# Patient Record
Sex: FEMALE | Race: WHITE
[De-identification: names, ages, dates, MRNs, and addresses within clinical notes are randomized per-mention and may not be internally consistent; named-entity substitution may affect disease eponyms.]

---

## 2018-01-19 ENCOUNTER — HOSPITAL ENCOUNTER (INPATIENT)
Dept: HOSPITAL 96 - M.ERS | Age: 83
LOS: 5 days | Discharge: NURSING, REHAB OR LONG-TERM CARE | DRG: 867 | End: 2018-01-24
Attending: INTERNAL MEDICINE | Admitting: INTERNAL MEDICINE
Payer: MEDICARE

## 2018-01-19 VITALS — DIASTOLIC BLOOD PRESSURE: 75 MMHG | SYSTOLIC BLOOD PRESSURE: 121 MMHG

## 2018-01-19 VITALS — WEIGHT: 91.5 LBS | HEIGHT: 60.98 IN | BODY MASS INDEX: 17.27 KG/M2

## 2018-01-19 VITALS — DIASTOLIC BLOOD PRESSURE: 61 MMHG | SYSTOLIC BLOOD PRESSURE: 121 MMHG

## 2018-01-19 VITALS — SYSTOLIC BLOOD PRESSURE: 145 MMHG | DIASTOLIC BLOOD PRESSURE: 79 MMHG

## 2018-01-19 DIAGNOSIS — Z88.8: ICD-10-CM

## 2018-01-19 DIAGNOSIS — B37.89: Primary | ICD-10-CM

## 2018-01-19 DIAGNOSIS — M81.0: ICD-10-CM

## 2018-01-19 DIAGNOSIS — Z79.899: ICD-10-CM

## 2018-01-19 DIAGNOSIS — R13.10: ICD-10-CM

## 2018-01-19 DIAGNOSIS — R62.7: ICD-10-CM

## 2018-01-19 DIAGNOSIS — E86.0: ICD-10-CM

## 2018-01-19 DIAGNOSIS — F03.90: ICD-10-CM

## 2018-01-19 DIAGNOSIS — E87.0: ICD-10-CM

## 2018-01-19 DIAGNOSIS — N39.0: ICD-10-CM

## 2018-01-19 DIAGNOSIS — K20.9: ICD-10-CM

## 2018-01-19 DIAGNOSIS — J44.1: ICD-10-CM

## 2018-01-19 DIAGNOSIS — R06.00: ICD-10-CM

## 2018-01-19 DIAGNOSIS — J02.8: ICD-10-CM

## 2018-01-19 DIAGNOSIS — Z91.041: ICD-10-CM

## 2018-01-19 DIAGNOSIS — E44.0: ICD-10-CM

## 2018-01-19 DIAGNOSIS — M19.90: ICD-10-CM

## 2018-01-19 DIAGNOSIS — G93.40: ICD-10-CM

## 2018-01-19 DIAGNOSIS — B34.9: ICD-10-CM

## 2018-01-19 DIAGNOSIS — Z88.6: ICD-10-CM

## 2018-01-19 LAB
ABSOLUTE BASOPHILS: 0 THOU/UL (ref 0–0.2)
ABSOLUTE EOSINOPHILS: 0.2 THOU/UL (ref 0–0.7)
ABSOLUTE MONOCYTES: 1 THOU/UL (ref 0–1.2)
ALBUMIN SERPL-MCNC: 2.8 G/DL (ref 3.4–5)
ALP SERPL-CCNC: 119 U/L (ref 46–116)
ALT SERPL-CCNC: 30 U/L (ref 30–65)
ANION GAP SERPL CALC-SCNC: 8 MMOL/L (ref 7–16)
APTT BLD: 24.5 SECONDS (ref 25–31.3)
AST SERPL-CCNC: 30 U/L (ref 15–37)
BASOPHILS NFR BLD AUTO: 0.4 %
BILIRUB SERPL-MCNC: 0.3 MG/DL
BUN SERPL-MCNC: 46 MG/DL (ref 7–18)
CALCIUM SERPL-MCNC: 9.4 MG/DL (ref 8.5–10.1)
CHLORIDE SERPL-SCNC: 109 MMOL/L (ref 98–107)
CO2 SERPL-SCNC: 30 MMOL/L (ref 21–32)
CREAT SERPL-MCNC: 1 MG/DL (ref 0.6–1.3)
EOSINOPHIL NFR BLD: 1.9 %
GLUCOSE SERPL-MCNC: 91 MG/DL (ref 70–99)
GRANULOCYTES NFR BLD MANUAL: 66.7 %
HCT VFR BLD CALC: 37.1 % (ref 37–47)
HGB BLD-MCNC: 11.8 GM/DL (ref 12–15)
INR PPP: 1.1
LIPASE: 392 U/L (ref 73–393)
LYMPHOCYTES # BLD: 2.1 THOU/UL (ref 0.8–5.3)
LYMPHOCYTES NFR BLD AUTO: 20.6 %
MAGNESIUM SERPL-MCNC: 2.5 MG/DL (ref 1.8–2.4)
MCH RBC QN AUTO: 25.2 PG (ref 26–34)
MCHC RBC AUTO-ENTMCNC: 31.9 G/DL (ref 28–37)
MCV RBC: 78.9 FL (ref 80–100)
MONOCYTES NFR BLD: 10.4 %
MPV: 7.8 FL. (ref 7.2–11.1)
NEUTROPHILS # BLD: 6.7 THOU/UL (ref 1.6–8.1)
NT-PRO BRAIN NAT PEPTIDE: 132 PG/ML (ref ?–300)
NUCLEATED RBCS: 0 /100WBC
PLATELET COUNT*: 400 THOU/UL (ref 150–400)
POTASSIUM SERPL-SCNC: 3.7 MMOL/L (ref 3.5–5.1)
PROT SERPL-MCNC: 7.8 G/DL (ref 6.4–8.2)
PROTHROMBIN TIME: 10.6 SECONDS (ref 9.2–11.5)
RBC # BLD AUTO: 4.7 MIL/UL (ref 4.2–5)
RDW-CV: 17.6 % (ref 10.5–14.5)
SODIUM SERPL-SCNC: 147 MMOL/L (ref 136–145)
TROPONIN-I LEVEL: <0.06 NG/ML (ref ?–0.06)
WBC # BLD AUTO: 10.1 THOU/UL (ref 4–11)

## 2018-01-19 NOTE — PROC
[address]  [address]                    PROCEDURE REPORT              
_______________________________________________________________________________
 
Name:       URIEL GUY              Room:           [address] [place]  
.R.#:  [id number]      Account #:      [id number]  
Admission:  01/19/18     Attend Phys:    Randall Faulkner MD 
Discharge:  01/24/18     Date of Birth:  [birth year]  
         Report #: [id number]
                                                                                
_______________________________________________________________________________
THIS REPORT FOR:  //name//                      
 
For additional GI report details, please see the Provation report in Perceptive
7 content.
 
 
 
 
 
 
 
 
 
 
 
 
 
 
 
 
 
 
 
 
 
 
 
 
 
 
 
 
 
 
 
 
 
 
 
 
 
 
 
 
                       
                                        By:                                
                 
D: 01/22/18     _______________________________________
T: 01/25/18 1325Medical Records Staff ADELINE       /AL

## 2018-01-20 VITALS — SYSTOLIC BLOOD PRESSURE: 153 MMHG | DIASTOLIC BLOOD PRESSURE: 91 MMHG

## 2018-01-20 VITALS — SYSTOLIC BLOOD PRESSURE: 132 MMHG | DIASTOLIC BLOOD PRESSURE: 55 MMHG

## 2018-01-20 VITALS — DIASTOLIC BLOOD PRESSURE: 43 MMHG | SYSTOLIC BLOOD PRESSURE: 138 MMHG

## 2018-01-20 LAB
%HYPO/RBC NFR BLD AUTO: (no result) %
ABSOLUTE MONOCYTES: 0.1 THOU/UL (ref 0–1.2)
ANION GAP SERPL CALC-SCNC: 11 MMOL/L (ref 7–16)
ANISOCYTOSIS BLD QL SMEAR: (no result)
BUN SERPL-MCNC: 44 MG/DL (ref 7–18)
CALCIUM SERPL-MCNC: 8.4 MG/DL (ref 8.5–10.1)
CHLORIDE SERPL-SCNC: 112 MMOL/L (ref 98–107)
CO2 SERPL-SCNC: 25 MMOL/L (ref 21–32)
CREAT SERPL-MCNC: 1 MG/DL (ref 0.6–1.3)
GLUCOSE SERPL-MCNC: 175 MG/DL (ref 70–99)
GRANULOCYTES NFR BLD MANUAL: 81 %
HCT VFR BLD CALC: 33 % (ref 37–47)
HGB BLD-MCNC: 10.5 GM/DL (ref 12–15)
LYMPHOCYTES # BLD: 0.4 THOU/UL (ref 0.8–5.3)
LYMPHOCYTES NFR BLD AUTO: 6 %
MCH RBC QN AUTO: 24.8 PG (ref 26–34)
MCHC RBC AUTO-ENTMCNC: 31.8 G/DL (ref 28–37)
MCV RBC: 77.8 FL (ref 80–100)
MONOCYTES NFR BLD: 2 %
MPV: 8.4 FL. (ref 7.2–11.1)
NEUTROPHILS # BLD: 6 THOU/UL (ref 1.6–8.1)
NEUTS BAND NFR BLD: 11 %
NUCLEATED RBCS: 0 /100WBC
PLATELET # BLD EST: ADEQUATE 10*3/UL
PLATELET COUNT*: 341 THOU/UL (ref 150–400)
POTASSIUM SERPL-SCNC: 3.9 MMOL/L (ref 3.5–5.1)
RBC # BLD AUTO: 4.25 MIL/UL (ref 4.2–5)
RDW-CV: 17.3 % (ref 10.5–14.5)
SODIUM SERPL-SCNC: 148 MMOL/L (ref 136–145)
WBC # BLD AUTO: 6.5 THOU/UL (ref 4–11)

## 2018-01-20 NOTE — EKG
[place] [address]
Phone:  [phone]                     ELECTROCARDIOGRAM REPORT      
_______________________________________________________________________________
 
Name:       PREMAMIRELLAURIEL              Room:           [address].R.#:  [id number]      Account #:      [id number]  
Admission:  18     Attend Phys:    Randall Faulkner MD 
Discharge:               Date of Birth:  [birth year]  
         Report #: [id number]
    [id number]
_______________________________________________________________________________
THIS REPORT FOR:  //name//                      
 
                         [facility] ED
                                       
Test Date:    2018               Test Time:    19:26:05
Pat Name:     URIEL GUY          Department:   
Patient ID:   [id number]            Room:         [facility]
Gender:       F                        Technician:   BD
:          1931               Requested By: Gaurang Young
Order Number: [id number] MD:   Herman Thompson
                                 Measurements
Intervals                              Axis          
Rate:         85                       P:            60
AR:           142                      QRS:          8
QRSD:         96                       T:            60
QT:           370                                    
QTc:          440                                    
                           Interpretive Statements
Sinus rhythm
Inferior infarct, old
Baseline wander in lead(s) II,III,aVL,aVF,V1,V4,V5,V6
Compared to ECG 2016 14:11:28
Myocardial infarct finding now present
Ventricular premature complex(es) no longer present
 
Electronically Signed On 2018 11:47:26 CST by Herman Thompson
[email or web address]
 
 
 
 
 
 
 
 
 
 
 
 
 
 
 
 
  <ELECTRONICALLY SIGNED>
                                           By: Herman Thompson MD, FACC   
  18     1147
D: 18   _____________________________________
T: 18   Herman Thompson MD, FAC     /EPI

## 2018-01-21 VITALS — SYSTOLIC BLOOD PRESSURE: 126 MMHG | DIASTOLIC BLOOD PRESSURE: 60 MMHG

## 2018-01-21 VITALS — SYSTOLIC BLOOD PRESSURE: 138 MMHG | DIASTOLIC BLOOD PRESSURE: 86 MMHG

## 2018-01-21 VITALS — DIASTOLIC BLOOD PRESSURE: 66 MMHG | SYSTOLIC BLOOD PRESSURE: 126 MMHG

## 2018-01-21 LAB
ABSOLUTE BASOPHILS: 0 THOU/UL (ref 0–0.2)
ABSOLUTE EOSINOPHILS: 0 THOU/UL (ref 0–0.7)
ABSOLUTE MONOCYTES: 0.9 THOU/UL (ref 0–1.2)
ALBUMIN SERPL-MCNC: 2.4 G/DL (ref 3.4–5)
ALP SERPL-CCNC: 85 U/L (ref 46–116)
ALT SERPL-CCNC: 22 U/L (ref 30–65)
ANION GAP SERPL CALC-SCNC: 8 MMOL/L (ref 7–16)
AST SERPL-CCNC: 22 U/L (ref 15–37)
BASOPHILS NFR BLD AUTO: 0.3 %
BILIRUB SERPL-MCNC: 0.2 MG/DL
BUN SERPL-MCNC: 27 MG/DL (ref 7–18)
CALCIUM SERPL-MCNC: 8.2 MG/DL (ref 8.5–10.1)
CHLORIDE SERPL-SCNC: 111 MMOL/L (ref 98–107)
CO2 SERPL-SCNC: 25 MMOL/L (ref 21–32)
CREAT SERPL-MCNC: 0.8 MG/DL (ref 0.6–1.3)
EOSINOPHIL NFR BLD: 0.1 %
GLUCOSE SERPL-MCNC: 107 MG/DL (ref 70–99)
GRANULOCYTES NFR BLD MANUAL: 75 %
HCT VFR BLD CALC: 28.7 % (ref 37–47)
HGB BLD-MCNC: 9.3 GM/DL (ref 12–15)
LYMPHOCYTES # BLD: 1.7 THOU/UL (ref 0.8–5.3)
LYMPHOCYTES NFR BLD AUTO: 15.9 %
MCH RBC QN AUTO: 25 PG (ref 26–34)
MCHC RBC AUTO-ENTMCNC: 32.5 G/DL (ref 28–37)
MCV RBC: 76.9 FL (ref 80–100)
MONOCYTES NFR BLD: 8.7 %
MPV: 8.2 FL. (ref 7.2–11.1)
NEUTROPHILS # BLD: 8.1 THOU/UL (ref 1.6–8.1)
NUCLEATED RBCS: 0 /100WBC
PLATELET COUNT*: 316 THOU/UL (ref 150–400)
POTASSIUM SERPL-SCNC: 3.5 MMOL/L (ref 3.5–5.1)
PROT SERPL-MCNC: 6.1 G/DL (ref 6.4–8.2)
RBC # BLD AUTO: 3.73 MIL/UL (ref 4.2–5)
RDW-CV: 16.9 % (ref 10.5–14.5)
SODIUM SERPL-SCNC: 144 MMOL/L (ref 136–145)
WBC # BLD AUTO: 10.7 THOU/UL (ref 4–11)

## 2018-01-22 VITALS — SYSTOLIC BLOOD PRESSURE: 149 MMHG | DIASTOLIC BLOOD PRESSURE: 107 MMHG

## 2018-01-22 VITALS — DIASTOLIC BLOOD PRESSURE: 77 MMHG | SYSTOLIC BLOOD PRESSURE: 155 MMHG

## 2018-01-22 VITALS — DIASTOLIC BLOOD PRESSURE: 71 MMHG | SYSTOLIC BLOOD PRESSURE: 137 MMHG

## 2018-01-22 LAB
ABSOLUTE BASOPHILS: 0.1 THOU/UL (ref 0–0.2)
ABSOLUTE EOSINOPHILS: 0.1 THOU/UL (ref 0–0.7)
ABSOLUTE MONOCYTES: 0.9 THOU/UL (ref 0–1.2)
ALBUMIN SERPL-MCNC: 2.4 G/DL (ref 3.4–5)
ALP SERPL-CCNC: 91 U/L (ref 46–116)
ALT SERPL-CCNC: 26 U/L (ref 30–65)
ANION GAP SERPL CALC-SCNC: 6 MMOL/L (ref 7–16)
AST SERPL-CCNC: 27 U/L (ref 15–37)
BASOPHILS NFR BLD AUTO: 0.8 %
BILIRUB SERPL-MCNC: 0.3 MG/DL
BUN SERPL-MCNC: 20 MG/DL (ref 7–18)
CALCIUM SERPL-MCNC: 8.3 MG/DL (ref 8.5–10.1)
CHLORIDE SERPL-SCNC: 106 MMOL/L (ref 98–107)
CO2 SERPL-SCNC: 27 MMOL/L (ref 21–32)
CREAT SERPL-MCNC: 0.8 MG/DL (ref 0.6–1.3)
EOSINOPHIL NFR BLD: 0.9 %
GLUCOSE SERPL-MCNC: 97 MG/DL (ref 70–99)
GRANULOCYTES NFR BLD MANUAL: 61.6 %
HCT VFR BLD CALC: 30 % (ref 37–47)
HGB BLD-MCNC: 10.1 GM/DL (ref 12–15)
LYMPHOCYTES # BLD: 2 THOU/UL (ref 0.8–5.3)
LYMPHOCYTES NFR BLD AUTO: 25.4 %
MCH RBC QN AUTO: 24.9 PG (ref 26–34)
MCHC RBC AUTO-ENTMCNC: 33.8 G/DL (ref 28–37)
MCV RBC: 73.6 FL (ref 80–100)
MONOCYTES NFR BLD: 11.3 %
MPV: 7.9 FL. (ref 7.2–11.1)
NEUTROPHILS # BLD: 5 THOU/UL (ref 1.6–8.1)
NUCLEATED RBCS: 0 /100WBC
PLATELET COUNT*: 382 THOU/UL (ref 150–400)
POTASSIUM SERPL-SCNC: 3.1 MMOL/L (ref 3.5–5.1)
PROT SERPL-MCNC: 6.1 G/DL (ref 6.4–8.2)
RBC # BLD AUTO: 4.08 MIL/UL (ref 4.2–5)
RDW-CV: 16.6 % (ref 10.5–14.5)
SODIUM SERPL-SCNC: 139 MMOL/L (ref 136–145)
WBC # BLD AUTO: 8.1 THOU/UL (ref 4–11)

## 2018-01-22 NOTE — EKG
[place] [address]
Phone:  [phone]                     ELECTROCARDIOGRAM REPORT      
_______________________________________________________________________________
 
Name:       SHELLYISIAHURIEL JOHN              Room:           [address]    ADM [place]  
M.R.#:  [id number]      Account #:      [id number]  
Admission:  18     Attend Phys:    Randall Faulkner MD 
Discharge:               Date of Birth:  [birth year]  
         Report #: [id number]
    [id number]
_______________________________________________________________________________
THIS REPORT FOR:  //name//                      
 
                          [facility]
                                       
Test Date:    2018               Test Time:    08:06:58
Pat Name:     URIEL GUY          Department:   
Patient ID:   [id number]            Room:         [address]
Gender:       F                        Technician:   JAZMIN
:          1931               Requested By: Horacio Martin
Order Number: [id number]    Gia MD:   Rodrigue Solis
                                 Measurements
Intervals                              Axis          
Rate:         72                       P:            51
IL:           144                      QRS:          20
QRSD:         126                      T:            44
QT:           411                                    
QTc:          450                                    
                           Interpretive Statements
Sinus rhythm
 
Minimal ST elevation, anterior leads
Baseline wander in lead(s) I
Compared to ECG 2018 19:26:05
no change
Electronically Signed On 2018 10:39:35 CST by Rodrigue Solis
[email or web address]
 
 
 
 
 
 
 
 
 
 
 
 
 
 
 
 
 
  <ELECTRONICALLY SIGNED>
                                           By: Rodrigue Solis MD, [facility]      
  18     1039
D: 18 0806   _____________________________________
T: 18 0806   Rodrigue Solis MD, [facility]        /EPI

## 2018-01-23 VITALS — SYSTOLIC BLOOD PRESSURE: 135 MMHG | DIASTOLIC BLOOD PRESSURE: 68 MMHG

## 2018-01-23 VITALS — DIASTOLIC BLOOD PRESSURE: 53 MMHG | SYSTOLIC BLOOD PRESSURE: 109 MMHG

## 2018-01-23 LAB
ALBUMIN SERPL-MCNC: 2.4 G/DL (ref 3.4–5)
ALP SERPL-CCNC: 97 U/L (ref 46–116)
ALT SERPL-CCNC: 31 U/L (ref 30–65)
ANION GAP SERPL CALC-SCNC: 7 MMOL/L (ref 7–16)
AST SERPL-CCNC: 30 U/L (ref 15–37)
BILIRUB SERPL-MCNC: 0.3 MG/DL
BILIRUB UR-MCNC: NEGATIVE MG/DL
BUN SERPL-MCNC: 16 MG/DL (ref 7–18)
CALCIUM SERPL-MCNC: 8.6 MG/DL (ref 8.5–10.1)
CHLORIDE SERPL-SCNC: 105 MMOL/L (ref 98–107)
CO2 SERPL-SCNC: 28 MMOL/L (ref 21–32)
COLOR UR: YELLOW
CREAT SERPL-MCNC: 0.7 MG/DL (ref 0.6–1.3)
GLUCOSE SERPL-MCNC: 98 MG/DL (ref 70–99)
HCT VFR BLD CALC: 35.1 % (ref 37–47)
HGB BLD-MCNC: 10.9 GM/DL (ref 12–15)
KETONES UR STRIP-MCNC: (no result) MG/DL
MAGNESIUM SERPL-MCNC: 2.1 MG/DL (ref 1.8–2.4)
MCH RBC QN AUTO: 24.6 PG (ref 26–34)
MCHC RBC AUTO-ENTMCNC: 31.2 G/DL (ref 28–37)
MCV RBC: 79 FL (ref 80–100)
MPV: 8.2 FL. (ref 7.2–11.1)
MUCUS: (no result) STRN/LPF
PLATELET COUNT*: 418 THOU/UL (ref 150–400)
POTASSIUM SERPL-SCNC: 3.5 MMOL/L (ref 3.5–5.1)
PROT SERPL-MCNC: 6.4 G/DL (ref 6.4–8.2)
PROT UR QL STRIP: NEGATIVE
RBC # BLD AUTO: 4.44 MIL/UL (ref 4.2–5)
RBC # UR STRIP: (no result) /UL
RBC #/AREA URNS HPF: (no result) /HPF (ref 0–2)
RDW-CV: 17.2 % (ref 10.5–14.5)
SODIUM SERPL-SCNC: 140 MMOL/L (ref 136–145)
SP GR UR STRIP: 1.01 (ref 1–1.03)
SQUAMOUS: (no result) /LPF (ref 0–3)
URINE CLARITY: CLEAR
URINE GLUCOSE-RANDOM: NEGATIVE
URINE LEUKOCYTES-REFLEX: (no result)
URINE NITRITE-REFLEX: NEGATIVE
URINE WBC-REFLEX: (no result) /HPF (ref 0–5)
UROBILINOGEN UR STRIP-ACNC: 0.2 E.U./DL (ref 0.2–1)
WBC # BLD AUTO: 7.6 THOU/UL (ref 4–11)

## 2018-01-24 VITALS — SYSTOLIC BLOOD PRESSURE: 133 MMHG | DIASTOLIC BLOOD PRESSURE: 69 MMHG

## 2018-01-24 VITALS — SYSTOLIC BLOOD PRESSURE: 118 MMHG | DIASTOLIC BLOOD PRESSURE: 64 MMHG

## 2018-01-24 VITALS — DIASTOLIC BLOOD PRESSURE: 56 MMHG | SYSTOLIC BLOOD PRESSURE: 180 MMHG

## 2018-01-24 VITALS — DIASTOLIC BLOOD PRESSURE: 68 MMHG | SYSTOLIC BLOOD PRESSURE: 142 MMHG

## 2018-01-24 VITALS — SYSTOLIC BLOOD PRESSURE: 180 MMHG | DIASTOLIC BLOOD PRESSURE: 56 MMHG

## 2018-01-24 VITALS — SYSTOLIC BLOOD PRESSURE: 151 MMHG | DIASTOLIC BLOOD PRESSURE: 69 MMHG

## 2018-01-31 NOTE — CON
[address]  [address]                    CONSULTATION                  
_______________________________________________________________________________
 
Name:       URIEL GUY              Room:           M.304-P    DIS IN  
M..#:  [id number]      Account #:      [id number]  
Admission:  01/19/18     Attend Phys:    Randall Faulkner MD 
Discharge:  01/24/18     Date of Birth:  [birth year]  
         Report #: [id number]
                                                                     [id number]  
_______________________________________________________________________________
THIS REPORT FOR:  //name//                      
 
CC: Randall Faulkner MD
    [facility]
 
DATE OF SERVICE:  01/20/2018
 
 
REASON FOR CONSULT:  Dysphagia and odynophagia.
 
REQUESTING PHYSICIAN:  Randall Faulkner MD
 
HISTORY OF PRESENT ILLNESS:  This is an 86-year-old female who lives in a
nursing home facility.  The patient's daughter had realized that she has been
losing 5 pounds every week due to inability to eat.  The patient apparently has
been complaining of pain during swallowing.
 
The patient's daughter reports that her mom has received 2 rounds of antibiotics
for a UTI and upper respiratory infection.
 
PAST MEDICAL HISTORY:  Significant for history of COPD, dementia, closed pelvic
fracture, syncopal episode, UTI, chronic constipation, dysphagia, depression,
dyslipidemia, insomnia.
 
ALLERGIES:  SIGNIFICANT TO CODEINE, IODINE AND CALAMINE.
 
MEDICATIONS:  Please refer to hospital MAR.
 
SOCIAL HISTORY:  The patient denies tobacco or alcohol use.  She is demented and
lives in a nursing home.
 
FAMILY HISTORY:  Noncontributory.
 
PHYSICAL EXAMINATION:
VITAL SIGNS:  Reveals blood pressure of 153/91, respirations 20, pulse 78,
temperature 97.9.
LUNGS:  Coarse breath sounds bilaterally.
CARDIOVASCULAR:  Regular rate.
ABDOMEN:  Soft, nontender, nondistended.
NEUROLOGIC:  The patient is disoriented and unable to communicate due to the
same.
 
LABORATORY DATA:  Reveal sodium of 148, potassium is 3.9, BUN is 44, creatinine
1.0, glucose 175.  AST is 30, ALT 30, alkaline phosphatase 119, magnesium is
2.5, calcium 8.4, total bili is 0.3.  INR is 1.1 with WBC of 6.5, hemoglobin
 
 
 
[facility] 
[address] [place] [address]                    CONSULTATION                  
_______________________________________________________________________________
 
Name:       BROOKSURIEL JOHN              Room:           [address] [place]  
[facility].#:  [id number]      Account #:      [id number]  
Admission:  01/19/18     Attend Phys:    Randall Faulkner MD 
Discharge:  01/24/18     Date of Birth:  [birth year]  
         Report #: [id number]
                                                                     [id number]  
_______________________________________________________________________________
10.5 and MCV of 77.
 
ASSESSMENT AND PLAN:  The patient with history of anorexia and dysphagia who has
had history of upper endoscopy with stretching of her esophagus many years ago. 
There were some whitish patches noted in the back of the throat, which may hint
Candida.  She is already on nystatin.  I will go ahead and give her Diflucan as
nystatin would not be effective for esophageal candidiasis.  I will schedule her
for upper endoscopy on Monday to rule out esophageal lesion, severe esophagitis,
benign esophageal strictures, Candida esophagitis.  We will make further
recommendation once endoscopy is complete.  Meanwhile, she should be on
antifungal therapy.
 
 
 
 
 
 
 
 
 
 
 
 
 
 
 
 
 
 
 
 
 
 
 
 
 
 
 
 
 
 
 
 
 
<ELECTRONICALLY SIGNED>
                                        By:  Horacio Martin MD            
01/31/18     1613
D: 01/20/18 1417_______________________________________
T: 01/21/18 0106Horacio Martin MD               /nt

## 2018-12-06 ENCOUNTER — HOSPITAL ENCOUNTER (INPATIENT)
Dept: HOSPITAL 96 - M.ERS | Age: 83
LOS: 5 days | Discharge: HOSPICE | DRG: 871 | End: 2018-12-11
Attending: INTERNAL MEDICINE | Admitting: INTERNAL MEDICINE
Payer: MEDICARE

## 2018-12-06 VITALS — WEIGHT: 93 LBS | BODY MASS INDEX: 16.48 KG/M2 | HEIGHT: 62.99 IN

## 2018-12-06 VITALS — SYSTOLIC BLOOD PRESSURE: 143 MMHG | DIASTOLIC BLOOD PRESSURE: 81 MMHG

## 2018-12-06 VITALS — DIASTOLIC BLOOD PRESSURE: 61 MMHG | SYSTOLIC BLOOD PRESSURE: 110 MMHG

## 2018-12-06 VITALS — SYSTOLIC BLOOD PRESSURE: 127 MMHG | DIASTOLIC BLOOD PRESSURE: 61 MMHG

## 2018-12-06 DIAGNOSIS — Z91.041: ICD-10-CM

## 2018-12-06 DIAGNOSIS — M81.0: ICD-10-CM

## 2018-12-06 DIAGNOSIS — Z79.899: ICD-10-CM

## 2018-12-06 DIAGNOSIS — F03.90: ICD-10-CM

## 2018-12-06 DIAGNOSIS — Z66: ICD-10-CM

## 2018-12-06 DIAGNOSIS — B37.0: ICD-10-CM

## 2018-12-06 DIAGNOSIS — J15.6: ICD-10-CM

## 2018-12-06 DIAGNOSIS — Z79.2: ICD-10-CM

## 2018-12-06 DIAGNOSIS — Z90.710: ICD-10-CM

## 2018-12-06 DIAGNOSIS — E78.5: ICD-10-CM

## 2018-12-06 DIAGNOSIS — M19.90: ICD-10-CM

## 2018-12-06 DIAGNOSIS — D50.9: ICD-10-CM

## 2018-12-06 DIAGNOSIS — E87.6: ICD-10-CM

## 2018-12-06 DIAGNOSIS — A41.9: Primary | ICD-10-CM

## 2018-12-06 DIAGNOSIS — F39: ICD-10-CM

## 2018-12-06 DIAGNOSIS — J44.0: ICD-10-CM

## 2018-12-06 DIAGNOSIS — Z88.6: ICD-10-CM

## 2018-12-06 DIAGNOSIS — I50.9: ICD-10-CM

## 2018-12-06 DIAGNOSIS — I83.892: ICD-10-CM

## 2018-12-06 DIAGNOSIS — J44.1: ICD-10-CM

## 2018-12-06 LAB
%HYPO/RBC NFR BLD AUTO: (no result) %
ABSOLUTE BASOPHILS: 0 THOU/UL (ref 0–0.2)
ABSOLUTE EOSINOPHILS: 0.1 THOU/UL (ref 0–0.7)
ABSOLUTE MONOCYTES: 1 THOU/UL (ref 0–1.2)
ALBUMIN SERPL-MCNC: 2.1 G/DL (ref 3.4–5)
ALP SERPL-CCNC: 132 U/L (ref 46–116)
ALT SERPL-CCNC: 15 U/L (ref 30–65)
ANION GAP SERPL CALC-SCNC: 8 MMOL/L (ref 7–16)
ANISOCYTOSIS BLD QL SMEAR: (no result)
APTT BLD: 25.1 SECONDS (ref 25–31.3)
AST SERPL-CCNC: 18 U/L (ref 15–37)
BASOPHILS NFR BLD AUTO: 0.2 %
BILIRUB SERPL-MCNC: 0.2 MG/DL
BUN SERPL-MCNC: 33 MG/DL (ref 7–18)
CALCIUM SERPL-MCNC: 8.9 MG/DL (ref 8.5–10.1)
CHLORIDE SERPL-SCNC: 114 MMOL/L (ref 98–107)
CO2 SERPL-SCNC: 27 MMOL/L (ref 21–32)
CREAT SERPL-MCNC: 1 MG/DL (ref 0.6–1.3)
EOSINOPHIL NFR BLD: 0.7 %
GLUCOSE SERPL-MCNC: 153 MG/DL (ref 70–99)
GRANULOCYTES NFR BLD MANUAL: 79.4 %
HCT VFR BLD CALC: 33.1 % (ref 37–47)
HGB BLD-MCNC: 10.2 GM/DL (ref 12–15)
INR PPP: 1.2
LYMPHOCYTES # BLD: 2.1 THOU/UL (ref 0.8–5.3)
LYMPHOCYTES NFR BLD AUTO: 13.4 %
MCH RBC QN AUTO: 22.8 PG (ref 26–34)
MCHC RBC AUTO-ENTMCNC: 30.9 G/DL (ref 28–37)
MCV RBC: 73.9 FL (ref 80–100)
MICROCYTES: (no result)
MONOCYTES NFR BLD: 6.3 %
MPV: 7.8 FL. (ref 7.2–11.1)
NEUTROPHILS # BLD: 12.5 THOU/UL (ref 1.6–8.1)
NUCLEATED RBCS: 0 /100WBC
PLATELET # BLD EST: ADEQUATE 10*3/UL
PLATELET COUNT*: 312 THOU/UL (ref 150–400)
POTASSIUM SERPL-SCNC: 3.6 MMOL/L (ref 3.5–5.1)
PROT SERPL-MCNC: 6.8 G/DL (ref 6.4–8.2)
PROTHROMBIN TIME: 12 SECONDS (ref 9.2–11.5)
RBC # BLD AUTO: 4.48 MIL/UL (ref 4.2–5)
RDW-CV: 18.2 % (ref 10.5–14.5)
SODIUM SERPL-SCNC: 149 MMOL/L (ref 136–145)
TROPONIN-I LEVEL: <0.06 NG/ML (ref ?–0.06)
WBC # BLD AUTO: 15.7 THOU/UL (ref 4–11)

## 2018-12-07 VITALS — SYSTOLIC BLOOD PRESSURE: 132 MMHG | DIASTOLIC BLOOD PRESSURE: 72 MMHG

## 2018-12-07 VITALS — SYSTOLIC BLOOD PRESSURE: 143 MMHG | DIASTOLIC BLOOD PRESSURE: 74 MMHG

## 2018-12-07 VITALS — DIASTOLIC BLOOD PRESSURE: 72 MMHG | SYSTOLIC BLOOD PRESSURE: 138 MMHG

## 2018-12-07 VITALS — DIASTOLIC BLOOD PRESSURE: 69 MMHG | SYSTOLIC BLOOD PRESSURE: 138 MMHG

## 2018-12-07 VITALS — SYSTOLIC BLOOD PRESSURE: 138 MMHG | DIASTOLIC BLOOD PRESSURE: 51 MMHG

## 2018-12-07 LAB
BACTERIA-REFLEX: (no result) /HPF
BILIRUB UR-MCNC: NEGATIVE MG/DL
COLOR UR: YELLOW
KETONES UR STRIP-MCNC: NEGATIVE MG/DL
PROT UR QL STRIP: (no result)
RBC # UR STRIP: (no result) /UL
RBC #/AREA URNS HPF: (no result) /HPF (ref 0–2)
SP GR UR STRIP: 1.02 (ref 1–1.03)
SQUAMOUS: (no result) /LPF (ref 0–3)
URINE CLARITY: CLEAR
URINE GLUCOSE-RANDOM: NEGATIVE
URINE LEUKOCYTES-REFLEX: (no result)
URINE NITRITE-REFLEX: NEGATIVE
URINE WBC-REFLEX: (no result) /HPF (ref 0–5)
UROBILINOGEN UR STRIP-ACNC: 0.2 E.U./DL (ref 0.2–1)

## 2018-12-07 NOTE — EKG
[place] [address]
Phone:  [phone]                     ELECTROCARDIOGRAM REPORT      
_______________________________________________________________________________
 
Name:       URIEL GUY JOHN              Room:           [address]    ADM [place]  
.R.#:  [id number]      Account #:      [id number]  
Admission:  18     Attend Phys:    Samir Mike MD 
Discharge:               Date of Birth:  [birth year]  
         Report #: [id number]
    [id number]
_______________________________________________________________________________
THIS REPORT FOR:  //name//                      
 
                         [facility] ED
                                       
Test Date:    2018               Test Time:    21:02:44
Pat Name:     URIEL GUY          Department:   
Patient ID:   [id number]            Room:         [facility]
Gender:       F                        Technician:   CIH BILL
:          1931               Requested By: Suzan Campos
Order Number: [id number] MD:   Rodrigue Solis
                                 Measurements
Intervals                              Axis          
Rate:         103                      P:            50
[place]:           136                      QRS:          -5
QRSD:         98                       T:            25
QT:           347                                    
QTc:          454                                    
                           Interpretive Statements
Sinus tachycardia
consider Inferior infarct, old
Probable anteroseptal infarct, old
Compared to ECG 2018 08:06:58
Myocardial infarct finding now present
Sinus rhythm no longer present
 
 
Electronically Signed On 2018 11:26:02 CST by Rodrigue Solis
[email or web address]
 
 
 
 
 
 
 
 
 
 
 
 
 
 
 
  <ELECTRONICALLY SIGNED>
                                           By: Rodrigue Solis MD, FAC      
  18     1126
D: 18   _____________________________________
T: 18   Rodrigue Solis MD, [facility]        /EPI

## 2018-12-08 VITALS — DIASTOLIC BLOOD PRESSURE: 68 MMHG | SYSTOLIC BLOOD PRESSURE: 146 MMHG

## 2018-12-08 VITALS — DIASTOLIC BLOOD PRESSURE: 60 MMHG | SYSTOLIC BLOOD PRESSURE: 107 MMHG

## 2018-12-08 VITALS — DIASTOLIC BLOOD PRESSURE: 58 MMHG | SYSTOLIC BLOOD PRESSURE: 108 MMHG

## 2018-12-08 VITALS — DIASTOLIC BLOOD PRESSURE: 110 MMHG | SYSTOLIC BLOOD PRESSURE: 136 MMHG

## 2018-12-08 VITALS — DIASTOLIC BLOOD PRESSURE: 59 MMHG | SYSTOLIC BLOOD PRESSURE: 124 MMHG

## 2018-12-08 VITALS — DIASTOLIC BLOOD PRESSURE: 57 MMHG | SYSTOLIC BLOOD PRESSURE: 132 MMHG

## 2018-12-08 LAB
ANION GAP SERPL CALC-SCNC: 9 MMOL/L (ref 7–16)
BUN SERPL-MCNC: 26 MG/DL (ref 7–18)
CALCIUM SERPL-MCNC: 8.1 MG/DL (ref 8.5–10.1)
CHLORIDE SERPL-SCNC: 117 MMOL/L (ref 98–107)
CO2 SERPL-SCNC: 22 MMOL/L (ref 21–32)
CREAT SERPL-MCNC: 0.9 MG/DL (ref 0.6–1.3)
GLUCOSE SERPL-MCNC: 98 MG/DL (ref 70–99)
HCT VFR BLD CALC: 29 % (ref 37–47)
HGB BLD-MCNC: 9.2 GM/DL (ref 12–15)
IRON SERPL-MCNC: 15 UG/DL (ref 50–175)
MCH RBC QN AUTO: 24.5 PG (ref 26–34)
MCHC RBC AUTO-ENTMCNC: 31.9 G/DL (ref 28–37)
MCV RBC: 76.8 FL (ref 80–100)
MPV: 8.2 FL. (ref 7.2–11.1)
PLATELET COUNT*: 249 THOU/UL (ref 150–400)
POTASSIUM SERPL-SCNC: 3.6 MMOL/L (ref 3.5–5.1)
RBC # BLD AUTO: 3.77 MIL/UL (ref 4.2–5)
RDW-CV: 18.7 % (ref 10.5–14.5)
SAO2 % BLD FROM PO2: 8 % (ref 20–39)
SODIUM SERPL-SCNC: 148 MMOL/L (ref 136–145)
WBC # BLD AUTO: 9.8 THOU/UL (ref 4–11)

## 2018-12-09 VITALS — SYSTOLIC BLOOD PRESSURE: 120 MMHG | DIASTOLIC BLOOD PRESSURE: 71 MMHG

## 2018-12-09 VITALS — SYSTOLIC BLOOD PRESSURE: 105 MMHG | DIASTOLIC BLOOD PRESSURE: 53 MMHG

## 2018-12-09 VITALS — SYSTOLIC BLOOD PRESSURE: 133 MMHG | DIASTOLIC BLOOD PRESSURE: 69 MMHG

## 2018-12-09 VITALS — SYSTOLIC BLOOD PRESSURE: 115 MMHG | DIASTOLIC BLOOD PRESSURE: 34 MMHG

## 2018-12-09 VITALS — SYSTOLIC BLOOD PRESSURE: 125 MMHG | DIASTOLIC BLOOD PRESSURE: 62 MMHG

## 2018-12-09 VITALS — SYSTOLIC BLOOD PRESSURE: 111 MMHG | DIASTOLIC BLOOD PRESSURE: 62 MMHG

## 2018-12-09 VITALS — SYSTOLIC BLOOD PRESSURE: 127 MMHG | DIASTOLIC BLOOD PRESSURE: 74 MMHG

## 2018-12-09 LAB
ABSOLUTE BASOPHILS: 0.1 THOU/UL (ref 0–0.2)
ABSOLUTE MONOCYTES: 0.7 THOU/UL (ref 0–1.2)
ANION GAP SERPL CALC-SCNC: 10 MMOL/L (ref 7–16)
ANISOCYTOSIS BLD QL SMEAR: (no result)
BASOPHILS NFR BLD AUTO: 1 %
BUN SERPL-MCNC: 23 MG/DL (ref 7–18)
BURR CELLS BLD QL SMEAR: (no result)
CALCIUM SERPL-MCNC: 8.5 MG/DL (ref 8.5–10.1)
CHLORIDE SERPL-SCNC: 112 MMOL/L (ref 98–107)
CO2 SERPL-SCNC: 24 MMOL/L (ref 21–32)
CREAT SERPL-MCNC: 0.8 MG/DL (ref 0.6–1.3)
GLUCOSE SERPL-MCNC: 134 MG/DL (ref 70–99)
GRANULOCYTES NFR BLD MANUAL: 66 %
HCT VFR BLD CALC: 29.9 % (ref 37–47)
HGB BLD-MCNC: 9.4 GM/DL (ref 12–15)
LYMPHOCYTES # BLD: 2.5 THOU/UL (ref 0.8–5.3)
LYMPHOCYTES NFR BLD AUTO: 24 %
MCH RBC QN AUTO: 23.8 PG (ref 26–34)
MCHC RBC AUTO-ENTMCNC: 31.4 G/DL (ref 28–37)
MCV RBC: 75.7 FL (ref 80–100)
MICROCYTES: (no result)
MONOCYTES NFR BLD: 7 %
MPV: 8.2 FL. (ref 7.2–11.1)
NEUTROPHILS # BLD: 7 THOU/UL (ref 1.6–8.1)
NEUTS BAND NFR BLD: 2 %
NUCLEATED RBCS: 0 /100WBC
PLATELET # BLD EST: ADEQUATE 10*3/UL
PLATELET COUNT*: 256 THOU/UL (ref 150–400)
POTASSIUM SERPL-SCNC: 3.1 MMOL/L (ref 3.5–5.1)
RBC # BLD AUTO: 3.95 MIL/UL (ref 4.2–5)
RDW-CV: 18.4 % (ref 10.5–14.5)
SODIUM SERPL-SCNC: 146 MMOL/L (ref 136–145)
WBC # BLD AUTO: 10.3 THOU/UL (ref 4–11)

## 2018-12-10 VITALS — DIASTOLIC BLOOD PRESSURE: 65 MMHG | SYSTOLIC BLOOD PRESSURE: 124 MMHG

## 2018-12-10 VITALS — DIASTOLIC BLOOD PRESSURE: 59 MMHG | SYSTOLIC BLOOD PRESSURE: 106 MMHG

## 2018-12-10 VITALS — SYSTOLIC BLOOD PRESSURE: 124 MMHG | DIASTOLIC BLOOD PRESSURE: 32 MMHG

## 2018-12-10 VITALS — DIASTOLIC BLOOD PRESSURE: 66 MMHG | SYSTOLIC BLOOD PRESSURE: 127 MMHG

## 2018-12-10 VITALS — DIASTOLIC BLOOD PRESSURE: 60 MMHG | SYSTOLIC BLOOD PRESSURE: 111 MMHG

## 2018-12-10 LAB
ANION GAP SERPL CALC-SCNC: 11 MMOL/L (ref 7–16)
BUN SERPL-MCNC: 16 MG/DL (ref 7–18)
CALCIUM SERPL-MCNC: 8.9 MG/DL (ref 8.5–10.1)
CHLORIDE SERPL-SCNC: 103 MMOL/L (ref 98–107)
CO2 SERPL-SCNC: 31 MMOL/L (ref 21–32)
CREAT SERPL-MCNC: 1 MG/DL (ref 0.6–1.3)
GLUCOSE SERPL-MCNC: 89 MG/DL (ref 70–99)
HCT VFR BLD CALC: 33 % (ref 37–47)
HGB BLD-MCNC: 10.5 GM/DL (ref 12–15)
MCH RBC QN AUTO: 23.4 PG (ref 26–34)
MCHC RBC AUTO-ENTMCNC: 31.9 G/DL (ref 28–37)
MCV RBC: 73.5 FL (ref 80–100)
MPV: 8.2 FL. (ref 7.2–11.1)
PLATELET COUNT*: 345 THOU/UL (ref 150–400)
POTASSIUM SERPL-SCNC: 3.4 MMOL/L (ref 3.5–5.1)
RBC # BLD AUTO: 4.5 MIL/UL (ref 4.2–5)
RDW-CV: 18.5 % (ref 10.5–14.5)
SODIUM SERPL-SCNC: 145 MMOL/L (ref 136–145)
WBC # BLD AUTO: 10.6 THOU/UL (ref 4–11)

## 2018-12-11 VITALS — SYSTOLIC BLOOD PRESSURE: 116 MMHG | DIASTOLIC BLOOD PRESSURE: 60 MMHG

## 2018-12-11 VITALS — DIASTOLIC BLOOD PRESSURE: 55 MMHG | SYSTOLIC BLOOD PRESSURE: 136 MMHG

## 2018-12-11 VITALS — SYSTOLIC BLOOD PRESSURE: 133 MMHG | DIASTOLIC BLOOD PRESSURE: 62 MMHG

## 2018-12-11 VITALS — SYSTOLIC BLOOD PRESSURE: 136 MMHG | DIASTOLIC BLOOD PRESSURE: 55 MMHG

## 2018-12-11 VITALS — DIASTOLIC BLOOD PRESSURE: 66 MMHG | SYSTOLIC BLOOD PRESSURE: 133 MMHG

## 2018-12-11 LAB
ANION GAP SERPL CALC-SCNC: 10 MMOL/L (ref 7–16)
BUN SERPL-MCNC: 25 MG/DL (ref 7–18)
CALCIUM SERPL-MCNC: 9.3 MG/DL (ref 8.5–10.1)
CHLORIDE SERPL-SCNC: 105 MMOL/L (ref 98–107)
CO2 SERPL-SCNC: 28 MMOL/L (ref 21–32)
CREAT SERPL-MCNC: 1.3 MG/DL (ref 0.6–1.3)
GLUCOSE SERPL-MCNC: 125 MG/DL (ref 70–99)
POTASSIUM SERPL-SCNC: 3.7 MMOL/L (ref 3.5–5.1)
SODIUM SERPL-SCNC: 143 MMOL/L (ref 136–145)